# Patient Record
Sex: FEMALE | Race: WHITE | NOT HISPANIC OR LATINO | Employment: UNEMPLOYED | URBAN - METROPOLITAN AREA
[De-identification: names, ages, dates, MRNs, and addresses within clinical notes are randomized per-mention and may not be internally consistent; named-entity substitution may affect disease eponyms.]

---
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
## 2021-08-13 ENCOUNTER — OFFICE VISIT (OUTPATIENT)
Dept: URGENT CARE | Facility: CLINIC | Age: 21
End: 2021-08-13
Payer: COMMERCIAL

## 2021-08-13 VITALS
DIASTOLIC BLOOD PRESSURE: 86 MMHG | WEIGHT: 219 LBS | BODY MASS INDEX: 36.49 KG/M2 | OXYGEN SATURATION: 92 % | TEMPERATURE: 99.3 F | RESPIRATION RATE: 18 BRPM | SYSTOLIC BLOOD PRESSURE: 130 MMHG | HEIGHT: 65 IN | HEART RATE: 81 BPM

## 2021-08-13 DIAGNOSIS — L30.9 DERMATITIS: Primary | ICD-10-CM

## 2021-08-13 PROCEDURE — 99213 OFFICE O/P EST LOW 20 MIN: CPT | Performed by: PHYSICIAN ASSISTANT

## 2021-08-13 RX ORDER — DROSPIRENONE AND ETHINYL ESTRADIOL 0.03MG-3MG
1 KIT ORAL DAILY
COMMUNITY

## 2021-08-13 RX ORDER — METHYLPREDNISOLONE 4 MG/1
TABLET ORAL
Qty: 21 TABLET | Refills: 0 | Status: SHIPPED | OUTPATIENT
Start: 2021-08-13

## 2021-08-13 NOTE — PROGRESS NOTES
[facility]        NAME: Matthew Mccain is a 22 y o  female  : 2000    MRN: [id number]  DATE: 2021  TIME: 5:05 PM    Assessment and Plan   Dermatitis [L30 9]  1  Dermatitis  methylPREDNISolone 4 MG tablet therapy pack         Patient Instructions     Patient Instructions   Take steroid pack as prescribed for suspected dermatitis of right arm pit  If symptoms persist upon finishing steroid try OTC antifungal cream for 1 week, return if symptoms persist following both treatments  Follow up with PCP  Follow up with PCP in 3-5 days  Proceed to  ER if symptoms worsen  Chief Complaint     Chief Complaint   Patient presents with    axila irritation     circular brown/red irritation of rt axila and some discoloration of left x 2/3 days         History of Present Illness       Patient is a 81-year-old female presenting today with right armpit rash x3 days  Patient notes she has had increasing redness and irritation of right armpit over the last few days, has tried applying hydrocortisone cream to area for 1 day with no relief, then notes applying antifungal cream for the last 2 days with no relief  Denies history of irritation to armpits, notes that she does shave the area regularly but has not since the onset of the redness  Denies fever, chills, discharge, drainage, pain, swelling  Review of Systems   Review of Systems   Constitutional: Negative for chills and fever  HENT: Negative for ear pain and sore throat  Eyes: Negative for pain and visual disturbance  Respiratory: Negative for cough and shortness of breath  Cardiovascular: Negative for chest pain and palpitations  Gastrointestinal: Negative for abdominal pain and vomiting  Genitourinary: Negative for dysuria and hematuria  Musculoskeletal: Negative for arthralgias and back pain  Skin: Positive for rash (Right armpit)  Neurological: Negative for seizures and syncope     All other systems reviewed and are negative  Current Medications       Current Outpatient Medications:     drospirenone-ethinyl estradiol (RY) 3-0 03 MG per tablet, Take 1 tablet by mouth daily, Disp: , Rfl:     EQ ALLERGY RELIEF 10 MG tablet, TAKE ONE TABLET BY MOUTH ONCE DAILY (Patient taking differently: daily as needed ), Disp: 30 tablet, Rfl: 3    fluticasone (FLONASE) 50 mcg/act nasal spray, 1 spray into each nostril daily (Patient taking differently: 1 spray into each nostril daily as needed ), Disp: 16 g, Rfl: 2    methylPREDNISolone 4 MG tablet therapy pack, Use as directed on package, Disp: 21 tablet, Rfl: 0    ondansetron (ZOFRAN-ODT) 4 mg disintegrating tablet, Take 1 tablet (4 mg total) by mouth every 8 (eight) hours as needed for nausea or vomiting (Patient not taking: Reported on 8/13/2021), Disp: 15 tablet, Rfl: 0    Current Allergies     Allergies as of 08/13/2021 - Reviewed 08/13/2021   Allergen Reaction Noted    Pollen extract  06/19/2014    Flagyl [metronidazole] Rash 12/14/2016            The following portions of the patient's history were reviewed and updated as appropriate: allergies, current medications, past family history, past medical history, past social history, past surgical history and problem list      Past Medical History:   Diagnosis Date    ADHD (attention deficit hyperactivity disorder)     Allergic rhinitis     Anxiety     Chiari I malformation ([facility] [address] )     Depression     Scoliosis        Past Surgical History:   Procedure Laterality Date    ABDOMINAL SURGERY      colon blockage,then had a dehiscence    ADENOIDECTOMY      CERVICAL FUSION      arnold/bebe malformation    HERNIA REPAIR      incisional hernia repair    SPINE SURGERY      thoracic and lumbar spine surgery with hardware    TONSILLECTOMY         No family history on file  Medications have been verified          Objective   /86   Pulse 81   Temp 99 3 °F (37 4 °C)   Resp 18   Ht 5' 5" (1 651 m)   Wt 99 3 kg (219 lb)   LMP  (LMP Unknown)   SpO2 92%   BMI 36 44 kg/m²        Physical Exam     Physical Exam  Vitals reviewed  Constitutional:       Appearance: Normal appearance  HENT:      Head: Normocephalic and atraumatic  Right Ear: Tympanic membrane, ear canal and external ear normal       Left Ear: Tympanic membrane, ear canal and external ear normal       Nose: Nose normal       Mouth/Throat:      Mouth: Mucous membranes are moist       Pharynx: Oropharynx is clear  Eyes:      Conjunctiva/sclera: Conjunctivae normal    Cardiovascular:      Rate and Rhythm: Normal rate and regular rhythm  Pulses: Normal pulses  Heart sounds: Normal heart sounds  Pulmonary:      Effort: Pulmonary effort is normal       Breath sounds: Normal breath sounds  Musculoskeletal:      Cervical back: Normal range of motion  No tenderness  Lymphadenopathy:      Cervical: No cervical adenopathy  Skin:     Findings: Rash present  Comments: Red nonraised area covering portion of right axillary area, no active discharge or drainage no well-demarcated borders, no tenderness to palpation, area dry to palpation some flaking present  No signs of infection  Findings consistent with dermatitis  Neurological:      General: No focal deficit present  Mental Status: She is alert and oriented to person, place, and time

## 2021-08-13 NOTE — PATIENT INSTRUCTIONS
Take steroid pack as prescribed for suspected dermatitis of right arm pit  If symptoms persist upon finishing steroid try OTC antifungal cream for 1 week, return if symptoms persist following both treatments  Follow up with PCP

## 2022-01-01 ENCOUNTER — TREATMENT (OUTPATIENT)
Dept: URGENT CARE | Facility: CLINIC | Age: 22
End: 2022-01-01
Payer: COMMERCIAL

## 2022-01-01 VITALS — RESPIRATION RATE: 14 BRPM | OXYGEN SATURATION: 98 % | HEART RATE: 99 BPM | TEMPERATURE: 98.2 F

## 2022-01-01 DIAGNOSIS — B34.9 VIRAL SYNDROME: Primary | ICD-10-CM

## 2022-01-01 LAB — S PYO AG THROAT QL: NEGATIVE

## 2022-01-01 PROCEDURE — 87070 CULTURE OTHR SPECIMN AEROBIC: CPT | Performed by: PHYSICIAN ASSISTANT

## 2022-01-01 PROCEDURE — 87880 STREP A ASSAY W/OPTIC: CPT

## 2022-01-01 PROCEDURE — 87636 SARSCOV2 & INF A&B AMP PRB: CPT | Performed by: PHYSICIAN ASSISTANT

## 2022-01-01 NOTE — PATIENT INSTRUCTIONS
Patient Instructions   COVID testing initiated  Results may take up to 5-10 days to return, but often come back sooner (2-4 days)     If the patient has a [facility] My Chart account, results may be accessed on line  If the patient does not have the [facility] account, please establish one so results can be accessed  This will be the easiest and quickest way to get a copy of your test results if you require printed documentation  If patient is symptomatic and until results are obtained, home quarantine / self isolation strongly encouraged  If testing is done for screening purposes and patient is not symptomatic, we still recommend masking, social distancing, good hygiene practices be followed  If COVID test is positive, patient / care giver will be contacted by ordering provider or designated staff  If COVID test is positive, please call the primary care provider office to inform of positive test and request follow up evaluation appointment  (Generally, primary care providers are doing telemedicine visits with their positive COVID patients )  If COVID test is positive, please again review all information below  Further questions may be addressed by the primary care provider or the [address] [place], [place] [address]jak Patton at [phone]  If the patient / caregiver has not heard about test results or has been unable to access results on the patient My Chart account in a timely fashion, please call the provider's office where test was ordered (or Hot Line if applicable)  to inquire about results  If results are negative and patient / care giver has been found to have already accessed results through the [facility] My Chart julissa, no call will be made  Until results are obtained, home quarantine / self isolation strongly encouraged       If the patient would develop profound weakness, chest pain, shortness of breath please proceed to an emergency room for further evaluation otherwise we do recommend that patient follow-up with their primary care provider in the next 5-7 days if not improving  Symptomatic treatment as needed for symptoms relief based on age / medical status of patient  Things like warm salt water gargles, Tylenol or Ibuprofen (if not contraindicated), drinking plenty of fluids, nasal saline rinses / spray, warm tea with honey (not for patients less than 1 year of age),  etc may provide symptoms relief  [address]     Your healthcare provider and/or public health staff have evaluated you and have determined that you do not need to remain in the hospital at this time  At this time you can be isolated at home where you will be monitored by staff from your local or state health department  You should carefully follow the prevention and isolation steps below until a healthcare provider or local or state health department says that you can return to your normal activities  Stay home except to get medical care     People who are mildly ill with COVID-19 are able to isolate at home during their illness  You should restrict activities outside your home, except for getting medical care  Do not go to work, school, or public areas  Avoid using public transportation, ride-sharing, or taxis  Separate yourself from other people and animals in your home     People: As much as possible, you should stay in a specific room and away from other people in your home  Also, you should use a separate bathroom, if available  Animals: You should restrict contact with pets and other animals while you are sick with COVID-19, just like you would around other people  Although there have not been reports of pets or other animals becoming sick with COVID-19, it is still recommended that people sick with COVID-19 limit contact with animals until more information is known about the virus   When possible, have another member of your household care for your animals while you are sick  If you are sick with COVID-19, avoid contact with your pet, including petting, snuggling, being kissed or licked, and sharing food  If you must care for your pet or be around animals while you are sick, wash your hands before and after you interact with pets and wear a facemask  See COVID-19 and Animals for more information  Call ahead before visiting your doctor     If you have a medical appointment, call the healthcare provider and tell them that you have or may have COVID-19  This will help the healthcare providers office take steps to keep other people from getting infected or exposed  Wear a facemask     You should wear a facemask when you are around other people (e g , sharing a room or vehicle) or pets and before you enter a healthcare providers office  If you are not able to wear a facemask (for example, because it causes trouble breathing), then people who live with you should not stay in the same room with you, or they should wear a facemask if they enter your room  Cover your coughs and sneezes     Cover your mouth and nose with a tissue when you cough or sneeze  Throw used tissues in a lined trash can  Immediately wash your hands with soap and water for at least 20 seconds or, if soap and water are not available, clean your hands with an alcohol-based hand  that contains at least 60% alcohol  Clean your hands often     Wash your hands often with soap and water for at least 20 seconds, especially after blowing your nose, coughing, or sneezing; going to the bathroom; and before eating or preparing food  If soap and water are not readily available, use an alcohol-based hand  with at least 60% alcohol, covering all surfaces of your hands and rubbing them together until they feel dry  Soap and water are the best option if hands are visibly dirty  Avoid touching your eyes, nose, and mouth with unwashed hands       Avoid sharing personal household items     You should not share dishes, drinking glasses, cups, eating utensils, towels, or bedding with other people or pets in your home  After using these items, they should be washed thoroughly with soap and water  Clean all high-touch surfaces everyday     High touch surfaces include counters, tabletops, doorknobs, bathroom fixtures, toilets, phones, keyboards, tablets, and bedside tables  Also, clean any surfaces that may have blood, stool, or body fluids on them  Use a household cleaning spray or wipe, according to the label instructions  Labels contain instructions for safe and effective use of the cleaning product including precautions you should take when applying the product, such as wearing gloves and making sure you have good ventilation during use of the product  Monitor your symptoms     Seek prompt medical attention if your illness is worsening (e g , difficulty breathing)  Before seeking care, call your healthcare provider and tell them that you have, or are being evaluated for, COVID-19  Put on a facemask before you enter the facility  These steps will help the healthcare providers office to keep other people in the office or waiting room from getting infected or exposed  Ask your healthcare provider to call the local or [facility] health department  Persons who are placed under active monitoring or facilitated self-monitoring should follow instructions provided by their local health department or occupational health professionals, as appropriate  If you have a medical emergency and need to call 911, notify the dispatch personnel that you have, or are being evaluated for COVID-19  If possible, put on a facemask before emergency medical services arrive       Discontinuing home isolation     Patients with confirmed COVID-19 should remain under home isolation precautions until the following conditions are met:   § They have had no fever for at least 24 hours (that is one full day of no fever without the use medicine that reduces fevers)  AND  § other symptoms have improved (for example, when their cough or shortness of breath have improved)  AND  § at least 10 days have passed since their symptoms first appeared     Patients with confirmed COVID-19 should also notify close contacts (including their workplace) and ask that they self-quarantine  Currently, close contact is defined as being within 6 feet for for a cumulative total of 15 minutes or more over a 24 hour period starting from 2 days before illness onset  (or, for asymptomatic patients, 2 days prior to test specimen collection)  Close contacts of patients diagnosed with COVID-19 should be instructed by the patient to self-quarantine for 14 days from the last time of their last contact with the patient        Source: [employer]

## 2022-01-01 NOTE — LETTER
January 1, 2022       Patient: Frannie Valdivia   YOB: 2000   Date of Visit: 1/1/2022        If Covid and flu tests are negative, patient may return to work/school when fever free for 24 hours without the use of a fever reducing agent  If covid or flu test is positive, patient may return to work five days after the onset of symptoms as long as they are fever free for 24 hours without the use of a fever reducing agent  Upon return, the patient must then adhere to strict masking for an additional 5 days  If you have any questions or concerns, please don't hesitate to call             Sincerely,        Hermelinda Charles PA-C

## 2022-01-01 NOTE — PROGRESS NOTES
[facility]        NAME: Kenia Landrum is a 22 y o  female  : 2000    MRN: [id number]  DATE: 2022  TIME: 12:09 PM    Assessment and Plan   Viral syndrome [B34 9]  1  Viral syndrome  COVID/FLU- Office Collect    POCT rapid strepA    Throat culture         Patient Instructions     Continue to monitor symptoms  If new or worsening symptoms develop, go immediately to Er  Drink plenty of fluids  Follow up with Family Doctor this week  Chief Complaint     Chief Complaint   Patient presents with    Cold Like Symptoms     pt presents with a headache, sore throat, started 1-2 days ago         History of Present Illness       Sore Throat   This is a new problem  The current episode started yesterday  The problem has been gradually worsening  Neither side of throat is experiencing more pain than the other  There has been no fever  The pain is moderate  Associated symptoms include congestion and coughing  Pertinent negatives include no diarrhea, ear pain, headaches, hoarse voice, neck pain, shortness of breath, swollen glands, trouble swallowing or vomiting  She has had no exposure to strep or mono  She has tried nothing for the symptoms  The treatment provided no relief  Not vaccinated  Numerous COVID contacts at work but unknown if any direct exposures  Review of Systems   Review of Systems   Constitutional: Negative for chills, diaphoresis, fatigue and fever  HENT: Positive for congestion, postnasal drip, sinus pressure, sinus pain, sneezing and sore throat  Negative for ear pain, hoarse voice, rhinorrhea, trouble swallowing and voice change  Eyes: Negative  Respiratory: Positive for cough  Negative for chest tightness, shortness of breath and wheezing  Cardiovascular: Negative for chest pain and palpitations  Gastrointestinal: Negative for constipation, diarrhea, nausea and vomiting  Endocrine: Negative  Genitourinary: Negative for dysuria     Musculoskeletal: Negative for back pain, myalgias and neck pain  Skin: Negative for pallor and rash  Allergic/Immunologic: Negative  Neurological: Negative for dizziness, syncope and headaches  Hematological: Negative  Psychiatric/Behavioral: Negative            Current Medications       Current Outpatient Medications:     drospirenone-ethinyl estradiol (RY) 3-0 03 MG per tablet, Take 1 tablet by mouth daily, Disp: , Rfl:     EQ ALLERGY RELIEF 10 MG tablet, TAKE ONE TABLET BY MOUTH ONCE DAILY (Patient taking differently: daily as needed ), Disp: 30 tablet, Rfl: 3    fluticasone (FLONASE) 50 mcg/act nasal spray, 1 spray into each nostril daily (Patient taking differently: 1 spray into each nostril daily as needed ), Disp: 16 g, Rfl: 2    methylPREDNISolone 4 MG tablet therapy pack, Use as directed on package (Patient not taking: Reported on 1/1/2022 ), Disp: 21 tablet, Rfl: 0    ondansetron (ZOFRAN-ODT) 4 mg disintegrating tablet, Take 1 tablet (4 mg total) by mouth every 8 (eight) hours as needed for nausea or vomiting (Patient not taking: Reported on 8/13/2021), Disp: 15 tablet, Rfl: 0    Current Allergies     Allergies as of 01/01/2022 - Reviewed 01/01/2022   Allergen Reaction Noted    Pollen extract  06/19/2014    Flagyl [metronidazole] Rash 12/14/2016            The following portions of the patient's history were reviewed and updated as appropriate: allergies, current medications, past family history, past medical history, past social history, past surgical history and problem list      Past Medical History:   Diagnosis Date    ADHD (attention deficit hyperactivity disorder)     Allergic rhinitis     Anxiety     [facility] malformation ([facility] [address] )     Depression     Scoliosis        Past Surgical History:   Procedure Laterality Date    ABDOMINAL SURGERY      colon blockage,then had a dehiscence    ADENOIDECTOMY      CERVICAL FUSION      arnold/bebe malformation    HERNIA REPAIR      incisional hernia repair   [facility] SPINE SURGERY      thoracic and lumbar spine surgery with hardware    TONSILLECTOMY         History reviewed  No pertinent family history  Medications have been verified  Objective   Pulse 99   Temp 98 2 °F (36 8 °C)   Resp 14   SpO2 98%        Physical Exam     Physical Exam  Vitals and nursing note reviewed  Constitutional:       General: She is not in acute distress  Appearance: Normal appearance  She is well-developed  She is not ill-appearing or diaphoretic  HENT:      Head: Normocephalic and atraumatic  Right Ear: Tympanic membrane, ear canal and external ear normal       Left Ear: Tympanic membrane, ear canal and external ear normal       Nose: Congestion present  No rhinorrhea  Mouth/Throat:      Pharynx: Posterior oropharyngeal erythema present  No oropharyngeal exudate  Eyes:      General:         Right eye: No discharge  Left eye: No discharge  Conjunctiva/sclera: Conjunctivae normal    Cardiovascular:      Rate and Rhythm: Normal rate and regular rhythm  Heart sounds: Normal heart sounds  Pulmonary:      Effort: Pulmonary effort is normal  No respiratory distress  Breath sounds: Normal breath sounds  No wheezing, rhonchi or rales  Musculoskeletal:      Cervical back: Normal range of motion and neck supple  Lymphadenopathy:      Cervical: No cervical adenopathy  Skin:     General: Skin is warm  Capillary Refill: Capillary refill takes less than 2 seconds  Findings: No rash  Neurological:      Mental Status: She is alert

## 2022-01-04 LAB — BACTERIA THROAT CULT: NORMAL

## 2022-01-06 LAB
FLUAV RNA RESP QL NAA+PROBE: NEGATIVE
FLUBV RNA RESP QL NAA+PROBE: NEGATIVE
SARS-COV-2 RNA RESP QL NAA+PROBE: POSITIVE

## 2023-01-15 ENCOUNTER — OFFICE VISIT (OUTPATIENT)
Dept: URGENT CARE | Facility: CLINIC | Age: 23
End: 2023-01-15

## 2023-01-15 VITALS
HEIGHT: 65 IN | RESPIRATION RATE: 18 BRPM | HEART RATE: 91 BPM | TEMPERATURE: 99 F | OXYGEN SATURATION: 100 % | WEIGHT: 228 LBS | BODY MASS INDEX: 37.99 KG/M2

## 2023-01-15 DIAGNOSIS — R30.0 DYSURIA: Primary | ICD-10-CM

## 2023-01-15 RX ORDER — NITROFURANTOIN 25; 75 MG/1; MG/1
100 CAPSULE ORAL 2 TIMES DAILY
Qty: 10 CAPSULE | Refills: 0 | Status: SHIPPED | OUTPATIENT
Start: 2023-01-15

## 2023-01-15 NOTE — PROGRESS NOTES
[facility]        NAME: Abdi Saleem is a 25 y o  female  : 2000    MRN: [id number]  DATE: January 15, 2023  TIME: 3:14 PM    Assessment and Plan   Dysuria [R30 0]  1  Dysuria  nitrofurantoin (MACROBID) 100 mg capsule    Urine culture            Patient Instructions   Dysuria:   -The patients hx and sx are most consistent with an acute uncomplicated UTI as she had leukocytes and nitrites on the home test kit  U/A unable to be run here due to AZO  Will treat with Macrobid 100mg taken as prescribed  Take with food and a probiotic  She has no fever, chills, body aches  No flank pain or CVA tenderness as per patient   -cx ordered today  Call in 48 hours for your results  -Warm heating pad on the abdomen for comfort  -AZO/Uricalm for pain control, do not take for more than 48 hours as this can mask worsening symptoms    -Stay VERY well hydrated and push fluids  You want your urine clear    -Prevention and precautions discussed  -If your sx worsen or persists, see your PCP or OBGYN immediately as discussed  Red flag signs discussed in depth  Follow up with PCP in 3-5 days  Proceed to  ER if symptoms worsen  Chief Complaint     Chief Complaint   Patient presents with   • Possible UTI     UTI s/s  History of Present Illness       The patient presents today for concern of UTI with urinary urgency, frequency and dysuria x 8 hours  She has taken one dose AZO today  She has no fever, chills, body aches  No nausea, vomiting, diarrhea  No flank pain, abdominal pain, pelvic pain  No hematuria  No abnormal vaginal bleeding or discharge  No vaginal pruritis or irritation  LMP was: She states that she has irregular menses, her last menstrual cycle was 2 months ago  She denies a chance of pregnancy  She took a home UTI test which showed nitrites and leukocytes           Review of Systems   Review of Systems   Constitutional: Negative for activity change, appetite change, chills, fatigue and fever    Respiratory: Negative for cough, chest tightness, shortness of breath and wheezing  Cardiovascular: Negative for chest pain and palpitations  Gastrointestinal: Negative for abdominal distention, abdominal pain, blood in stool, constipation, diarrhea, nausea and vomiting  Genitourinary: Positive for dysuria, frequency and urgency  Negative for decreased urine volume, flank pain, hematuria, vaginal bleeding, vaginal discharge and vaginal pain  Musculoskeletal: Negative for arthralgias and myalgias  Skin: Negative for rash  Hematological: Negative for adenopathy  Does not bruise/bleed easily           Current Medications       Current Outpatient Medications:   •  nitrofurantoin (MACROBID) 100 mg capsule, Take 1 capsule (100 mg total) by mouth 2 (two) times a day, Disp: 10 capsule, Rfl: 0  •  EQ ALLERGY RELIEF 10 MG tablet, TAKE ONE TABLET BY MOUTH ONCE DAILY (Patient taking differently: daily as needed ), Disp: 30 tablet, Rfl: 3  •  fluticasone (FLONASE) 50 mcg/act nasal spray, 1 spray into each nostril daily (Patient taking differently: 1 spray into each nostril daily as needed ), Disp: 16 g, Rfl: 2  •  methylPREDNISolone 4 MG tablet therapy pack, Use as directed on package (Patient not taking: Reported on 1/1/2022 ), Disp: 21 tablet, Rfl: 0  •  ondansetron (ZOFRAN-ODT) 4 mg disintegrating tablet, Take 1 tablet (4 mg total) by mouth every 8 (eight) hours as needed for nausea or vomiting (Patient not taking: Reported on 8/13/2021), Disp: 15 tablet, Rfl: 0    Current Allergies     Allergies as of 01/15/2023 - Reviewed 01/15/2023   Allergen Reaction Noted   • Pollen extract  06/19/2014   • Flagyl [metronidazole] Rash 12/14/2016            The following portions of the patient's history were reviewed and updated as appropriate: allergies, current medications, past family history, past medical history, past social history, past surgical history and problem list      Past Medical History:   Diagnosis Date   • ADHD (attention deficit hyperactivity disorder)    • Allergic rhinitis    • Anxiety    • Chiari I malformation ([address] )    • Depression    • Scoliosis        Past Surgical History:   Procedure Laterality Date   • ABDOMINAL SURGERY      colon blockage,then had a dehiscence   • ADENOIDECTOMY     • CERVICAL FUSION      arnold/bebe malformation   • HERNIA REPAIR      incisional hernia repair   • SPINE SURGERY      thoracic and lumbar spine surgery with hardware   • TONSILLECTOMY         History reviewed  No pertinent family history  Medications have been verified  Objective   Pulse 91   Temp 99 °F (37 2 °C)   Resp 18   Ht 5' 5" (1 651 m)   Wt 103 kg (228 lb)   SpO2 100%   BMI 37 94 kg/m²   No LMP recorded  Physical Exam     Physical Exam  Vitals and nursing note reviewed  Constitutional:       General: She is not in acute distress  Appearance: Normal appearance  She is well-developed  She is not diaphoretic  Cardiovascular:      Rate and Rhythm: Normal rate and regular rhythm  Heart sounds: Normal heart sounds  Pulmonary:      Effort: Pulmonary effort is normal       Breath sounds: Normal breath sounds  Abdominal:      General: Abdomen is flat  Bowel sounds are normal  There is no distension  Palpations: Abdomen is soft  Abdomen is not rigid  Tenderness: There is no abdominal tenderness  There is no right CVA tenderness, left CVA tenderness, guarding or rebound  Negative signs include Henson's sign and McBurney's sign  Hernia: No hernia is present  Skin:     General: Skin is warm and dry  Capillary Refill: Capillary refill takes less than 2 seconds     Psychiatric:         Behavior: Behavior normal

## 2023-01-15 NOTE — PATIENT INSTRUCTIONS
Dysuria   WHAT YOU NEED TO KNOW:   Dysuria is difficulty urinating, or pain, burning, or discomfort with urination  Dysuria is usually a symptom of another problem  DISCHARGE INSTRUCTIONS:   Return to the emergency department if:   You have severe back, side, or abdominal pain  You have fever and shaking chills  You vomit several times in a row  Contact your healthcare provider if:   Your symptoms do not go away, even after treatment  You have questions or concerns about your condition or care  Medicines:   Medicines  may be given to help treat a bacterial infection or help decrease bladder spasms  Take your medicine as directed  Contact your healthcare provider if you think your medicine is not helping or if you have side effects  Tell him of her if you are allergic to any medicine  Keep a list of the medicines, vitamins, and herbs you take  Include the amounts, and when and why you take them  Bring the list or the pill bottles to follow-up visits  Carry your medicine list with you in case of an emergency  Follow up with your healthcare provider as directed: Your healthcare provider may also refer you to a urologist or nephrologist to have additional testing  Write down your questions so you remember to ask them during your visits  Manage your dysuria:   Drink more liquids  Liquids help flush out bacteria that may be causing an infection  Ask your healthcare provider how much liquid to drink each day and which liquids are best for you  Take sitz baths as directed  Fill a bathtub with 4 to 6 inches of warm water  You may also use a sitz bath pan that fits over a toilet  Sit in the sitz bath for 20 minutes  Do this 2 to 3 times a day, or as directed  The warm water can help decrease pain and swelling  © Copyright [employer] 2022 Information is for End User's use only and may not be sold, redistributed or otherwise used for commercial purposes   All illustrations and images included in [facility] are the copyrighted property of [employer]  or [address] [place] [address]  The above information is an  only  It is not intended as medical advice for individual conditions or treatments  Talk to your doctor, nurse or pharmacist before following any medical regimen to see if it is safe and effective for you  Dysuria:   -The patients hx and sx are most consistent with an acute uncomplicated UTI as she had leukocytes and nitrites on the home test kit  U/A unable to be run here due to AZO  Will treat with Macrobid 100mg taken as prescribed  Take with food and a probiotic  She has no fever, chills, body aches  No flank pain or CVA tenderness as per patient   -cx ordered today  Call in 48 hours for your results  -Warm heating pad on the abdomen for comfort  -AZO/Uricalm for pain control, do not take for more than 48 hours as this can mask worsening symptoms    -Stay VERY well hydrated and push fluids  You want your urine clear    -Prevention and precautions discussed  -If your sx worsen or persists, see your PCP or OBGYN immediately as discussed  Red flag signs discussed in depth

## 2023-01-17 LAB — BACTERIA UR CULT: NORMAL

## 2023-10-27 ENCOUNTER — OFFICE VISIT (OUTPATIENT)
Dept: URGENT CARE | Facility: CLINIC | Age: 23
End: 2023-10-27

## 2023-10-27 VITALS
WEIGHT: 196.6 LBS | SYSTOLIC BLOOD PRESSURE: 110 MMHG | BODY MASS INDEX: 31.6 KG/M2 | HEART RATE: 84 BPM | TEMPERATURE: 98.8 F | OXYGEN SATURATION: 98 % | RESPIRATION RATE: 18 BRPM | DIASTOLIC BLOOD PRESSURE: 70 MMHG | HEIGHT: 66 IN

## 2023-10-27 DIAGNOSIS — J02.9 SORE THROAT: Primary | ICD-10-CM

## 2023-10-27 LAB — S PYO AG THROAT QL: NEGATIVE

## 2023-10-27 PROCEDURE — 99213 OFFICE O/P EST LOW 20 MIN: CPT | Performed by: FAMILY MEDICINE

## 2023-10-27 PROCEDURE — 87070 CULTURE OTHR SPECIMN AEROBIC: CPT | Performed by: FAMILY MEDICINE

## 2023-10-27 PROCEDURE — 87880 STREP A ASSAY W/OPTIC: CPT | Performed by: FAMILY MEDICINE

## 2023-10-27 NOTE — LETTER
October 27, 2023     Patient: Ira Seip   YOB: 2000   Date of Visit: 10/27/2023       To Whom It May Concern:    Ira Seip was evaluated in my office on 10/27/2023. Please excuse her absence. May return to work on 10/28/2023. If you have any questions or concerns, please don't hesitate to call.          Sincerely,        Rachel Pereyra MD

## 2023-10-27 NOTE — PROGRESS NOTES
[facility]        NAME: Hayes Lemon is a 26 y.o. female  : 2000    MRN: [id number]  DATE: 2023  TIME: 1:03 PM    Assessment and Plan   Sore throat [J02.9]  1. Sore throat  POCT rapid strepA        Negative rapid strep test.  Likely experiencing a viral URI. Advised on supportive measures including hydrating with plenty of water, gargling with warm salt water and having a preference for warm teas and hot soups. May continue with OTC medications. Patient Instructions     Follow up with PCP in 3-5 days. Proceed to  ER if symptoms worsen. Chief Complaint     Chief Complaint   Patient presents with    Sore Throat    Cold Like Symptoms     Started yesterday with sore throat. Today some nasal congestion and dry cough, Took TheraFlu last night and DayQuil today. Had fever 102 last night and today 100.8. History of Present Illness       78-year-old female presents today with a sore throat which started yesterday. Complicated by nasal congestion, dry cough, diaphoresis, chills and fevers up to 102 degrees last night which has steadily improved since taking TheraFlu last night and DayQuil this morning. Reports that her nephew had a mild cough prior to the onset of her symptoms. Review of Systems   Review of Systems   Constitutional:  Positive for chills, diaphoresis and fever. HENT:  Positive for congestion, rhinorrhea and sore throat. Negative for ear pain. Respiratory:  Positive for cough. Negative for shortness of breath. Cardiovascular:  Negative for chest pain. Gastrointestinal:  Positive for nausea. Negative for abdominal pain. Neurological:  Positive for dizziness and headaches.        Current Medications       Current Outpatient Medications:     EQ ALLERGY RELIEF 10 MG tablet, TAKE ONE TABLET BY MOUTH ONCE DAILY (Patient taking differently: daily as needed), Disp: 30 tablet, Rfl: 3    fluticasone (FLONASE) 50 mcg/act nasal spray, 1 spray into each nostril daily (Patient taking differently: 1 spray into each nostril daily as needed), Disp: 16 g, Rfl: 2    Current Allergies     Allergies as of 10/27/2023 - Reviewed 10/27/2023   Allergen Reaction Noted    Pollen extract  06/19/2014    Flagyl [metronidazole] Rash 12/14/2016            The following portions of the patient's history were reviewed and updated as appropriate: allergies, current medications, past family history, past medical history, past social history, past surgical history and problem list.     Past Medical History:   Diagnosis Date    ADHD (attention deficit hyperactivity disorder)     Allergic rhinitis     Anxiety     Chiari I malformation ([address])     Depression     Scoliosis        Past Surgical History:   Procedure Laterality Date    ABDOMINAL SURGERY      colon blockage,then had a dehiscence    ADENOIDECTOMY      CERVICAL FUSION      arnold/bebe malformation    HERNIA REPAIR      incisional hernia repair    SPINE SURGERY      thoracic and lumbar spine surgery with hardware    TONSILLECTOMY         No family history on file. Medications have been verified. Objective   /70   Pulse 84   Temp 98.8 °F (37.1 °C)   Resp 18   Ht 5' 6" (1.676 m)   Wt 89.2 kg (196 lb 9.6 oz)   LMP 10/24/2023 (Exact Date)   SpO2 98%   BMI 31.73 kg/m²   Patient's last menstrual period was 10/24/2023 (exact date). Physical Exam     Physical Exam  Vitals and nursing note reviewed. Constitutional:       General: She is in acute distress. Appearance: Normal appearance. She is well-developed and normal weight. She is not ill-appearing, toxic-appearing or diaphoretic. HENT:      Head: Normocephalic and atraumatic. Mouth/Throat:      Mouth: Mucous membranes are moist.      Pharynx: Oropharynx is clear. No oropharyngeal exudate or posterior oropharyngeal erythema. Tonsils: 1+ on the right. 1+ on the left. Eyes:      General:         Right eye: No discharge.          Left eye: No discharge. Conjunctiva/sclera: Conjunctivae normal.   Neck:      Thyroid: No thyromegaly. Cardiovascular:      Rate and Rhythm: Normal rate and regular rhythm. Pulmonary:      Effort: Pulmonary effort is normal. No respiratory distress. Breath sounds: Normal breath sounds. No wheezing, rhonchi or rales. Abdominal:      General: Abdomen is flat. Lymphadenopathy:      Cervical: No cervical adenopathy. Skin:     General: Skin is warm. Findings: No erythema. Neurological:      General: No focal deficit present. Mental Status: She is alert and oriented to person, place, and time. Psychiatric:         Mood and Affect: Mood normal.         Behavior: Behavior normal.         Thought Content:  Thought content normal.         Judgment: Judgment normal.

## 2023-10-29 LAB — BACTERIA THROAT CULT: NORMAL

## 2024-05-19 ENCOUNTER — OFFICE VISIT (OUTPATIENT)
Dept: URGENT CARE | Facility: CLINIC | Age: 24
End: 2024-05-19

## 2024-05-19 VITALS
OXYGEN SATURATION: 98 % | RESPIRATION RATE: 18 BRPM | DIASTOLIC BLOOD PRESSURE: 76 MMHG | HEART RATE: 76 BPM | WEIGHT: 202.8 LBS | TEMPERATURE: 98.4 F | SYSTOLIC BLOOD PRESSURE: 118 MMHG | BODY MASS INDEX: 32.73 KG/M2

## 2024-05-19 DIAGNOSIS — R51.9 ACUTE NONINTRACTABLE HEADACHE, UNSPECIFIED HEADACHE TYPE: Primary | ICD-10-CM

## 2024-05-19 PROCEDURE — 99213 OFFICE O/P EST LOW 20 MIN: CPT | Performed by: PHYSICIAN ASSISTANT

## 2024-05-19 RX ORDER — ONDANSETRON 4 MG/1
4 TABLET, ORALLY DISINTEGRATING ORAL EVERY 6 HOURS PRN
Qty: 20 TABLET | Refills: 0 | Status: SHIPPED | OUTPATIENT
Start: 2024-05-19

## 2024-05-19 NOTE — LETTER
May 19, 2024     Patient: Allison Barragan   YOB: 2000   Date of Visit: 5/19/2024       To Whom it May Concern:    Allison Barragan was seen in my clinic on 5/19/2024. She may return to work on 5/20/2024 .    If you have any questions or concerns, please don't hesitate to call.         Sincerely,          Tamanna Sheffield PA-C        CC: No Recipients

## 2024-05-19 NOTE — PROGRESS NOTES
[facility]        NAME: Allison Barragan is a 23 y.o. female  : 2000    MRN: [id number]  DATE: May 19, 2024  TIME: 3:46 PM    Assessment and Plan   Acute nonintractable headache, unspecified headache type [R51.9]  1. Acute nonintractable headache, unspecified headache type  ondansetron (ZOFRAN-ODT) 4 mg disintegrating tablet        May be related to allergies or dehydration.  Discussed supportive care and pcp follow up. Discussed strict return to care precautions as well as red flag symptoms which should prompt immediate ED referral. Pt verbalized understanding and is in agreement with plan.  Please follow up with your primary care provider within the next week. Please remember that your visit today was with an urgent care provider and should not replace follow up with your primary care provider for chronic medical issues or annual physicals.       Patient Instructions       Follow up with PCP in 3-5 days.  Proceed to  ER if symptoms worsen.    If tests are performed, our office will contact you with results only if changes need to made to the care plan discussed with you at the visit. You can review your full results on [facility].    Chief Complaint     Chief Complaint   Patient presents with    Nausea     Pt states she has been having headaches for about a week and started with nausea last night. Pt states no head injury has caused this. OTC- aleve (slight relief)         History of Present Illness       Patient is a 23-year-old female with PMH seasonal allergies presenting with intermittent headache, dizziness, nausea x 1 week.  Also endorses sneezing and thinks this may all be related to her allergies.  Does have a history of migraines presenting similarly but less severe.  Currently rates headache as 3 out of 10 in severity.  No OTC meds taken today.  States she feels most dizzy when getting up too quickly.  Drinks about 3 bottles of water per day.  Denies any chance of  pregnancy.        Review of Systems   Review of Systems   Constitutional:  Positive for fatigue. Negative for chills, diaphoresis and fever.   HENT:  Positive for rhinorrhea and sneezing. Negative for congestion, ear pain, sinus pain, sore throat and trouble swallowing.    Eyes:  Negative for pain and redness.   Respiratory:  Negative for cough, chest tightness, shortness of breath and wheezing.    Cardiovascular:  Negative for chest pain and leg swelling.   Gastrointestinal:  Positive for nausea. Negative for diarrhea and vomiting.   Musculoskeletal:  Negative for myalgias.   Neurological:  Positive for dizziness and headaches. Negative for weakness.         Current Medications       Current Outpatient Medications:     ondansetron (ZOFRAN-ODT) 4 mg disintegrating tablet, Take 1 tablet (4 mg total) by mouth every 6 (six) hours as needed for nausea or vomiting, Disp: 20 tablet, Rfl: 0    EQ ALLERGY RELIEF 10 MG tablet, TAKE ONE TABLET BY MOUTH ONCE DAILY (Patient not taking: Reported on 5/19/2024), Disp: 30 tablet, Rfl: 3    fluticasone (FLONASE) 50 mcg/act nasal spray, 1 spray into each nostril daily (Patient not taking: Reported on 5/19/2024), Disp: 16 g, Rfl: 2    Current Allergies     Allergies as of 05/19/2024 - Reviewed 05/19/2024   Allergen Reaction Noted    Pollen extract  06/19/2014    Flagyl [metronidazole] Rash 12/14/2016            The following portions of the patient's history were reviewed and updated as appropriate: allergies, current medications, past family history, past medical history, past social history, past surgical history and problem list.     Past Medical History:   Diagnosis Date    ADHD (attention deficit hyperactivity disorder)     Allergic rhinitis     Anxiety     Chiari I malformation ([facility])     Depression     Scoliosis        Past Surgical History:   Procedure Laterality Date    ABDOMINAL SURGERY      colon blockage,then had a dehiscence    ADENOIDECTOMY      CERVICAL FUSION       arnold/bebe malformation    HERNIA REPAIR      incisional hernia repair    SPINE SURGERY      thoracic and lumbar spine surgery with hardware    TONSILLECTOMY         History reviewed. No pertinent family history.      Medications have been verified.        Objective   /76 (BP Location: Left arm, Patient Position: Sitting)   Pulse 76   Temp 98.4 °F (36.9 °C)   Resp 18   Wt 92 kg (202 lb 12.8 oz)   LMP 05/08/2024 (Exact Date)   SpO2 98%   BMI 32.73 kg/m²        Physical Exam     Physical Exam  Vitals and nursing note reviewed.   Constitutional:       General: She is not in acute distress.     Appearance: Normal appearance. She is not toxic-appearing.   HENT:      Head: Normocephalic and atraumatic.      Right Ear: Tympanic membrane, ear canal and external ear normal.      Left Ear: Tympanic membrane, ear canal and external ear normal.      Nose: No congestion.      Mouth/Throat:      Mouth: Mucous membranes are moist.      Pharynx: Oropharynx is clear. No oropharyngeal exudate or posterior oropharyngeal erythema.   Eyes:      Extraocular Movements: Extraocular movements intact.      Conjunctiva/sclera: Conjunctivae normal.      Pupils: Pupils are equal, round, and reactive to light.   Cardiovascular:      Rate and Rhythm: Normal rate and regular rhythm.      Heart sounds: Normal heart sounds.   Pulmonary:      Effort: Pulmonary effort is normal. No respiratory distress.      Breath sounds: Normal breath sounds. No wheezing, rhonchi or rales.   Musculoskeletal:      Cervical back: Normal range of motion and neck supple.   Lymphadenopathy:      Cervical: No cervical adenopathy.   Skin:     General: Skin is warm and dry.      Capillary Refill: Capillary refill takes less than 2 seconds.   Neurological:      General: No focal deficit present.      Mental Status: She is alert and oriented to person, place, and time.      Cranial Nerves: No cranial nerve deficit.      Motor: No weakness.   Psychiatric:          Behavior: Behavior normal.

## 2025-05-05 ENCOUNTER — OFFICE VISIT (OUTPATIENT)
Dept: URGENT CARE | Facility: CLINIC | Age: 25
End: 2025-05-05
Payer: COMMERCIAL

## 2025-05-05 VITALS
SYSTOLIC BLOOD PRESSURE: 124 MMHG | HEIGHT: 66 IN | WEIGHT: 206.6 LBS | BODY MASS INDEX: 33.2 KG/M2 | HEART RATE: 76 BPM | OXYGEN SATURATION: 98 % | TEMPERATURE: 98.5 F | DIASTOLIC BLOOD PRESSURE: 68 MMHG

## 2025-05-05 DIAGNOSIS — G43.909 MIGRAINE WITHOUT STATUS MIGRAINOSUS, NOT INTRACTABLE, UNSPECIFIED MIGRAINE TYPE: ICD-10-CM

## 2025-05-05 DIAGNOSIS — J30.2 SEASONAL ALLERGIC RHINITIS, UNSPECIFIED TRIGGER: Primary | ICD-10-CM

## 2025-05-05 PROCEDURE — 99203 OFFICE O/P NEW LOW 30 MIN: CPT | Performed by: PHYSICIAN ASSISTANT

## 2025-05-05 NOTE — PROGRESS NOTES
[facility]        NAME: Allison Barragan is a 24 y.o. female  : 2000    MRN: [id number]  DATE: May 5, 2025  TIME: 3:46 PM    Assessment and Plan   Seasonal allergic rhinitis, unspecified trigger [J30.2]  1. Seasonal allergic rhinitis, unspecified trigger        2. Migraine without status migrainosus, not intractable, unspecified migraine type          Recommend trialing a different antihistamine and adding flonase. Recommend starting a headache diary to try to identify potential triggers. If they are not improving should fu with pcp.    Discussed strict return to care precautions as well as red flag symptoms which should prompt immediate ED referral. Pt verbalized understanding and is in agreement with plan.  Please follow up with your primary care provider within the next week. Please remember that your visit today was with an urgent care provider and should not replace follow up with your primary care provider for chronic medical issues or annual physicals.       Patient Instructions       Follow up with PCP in 3-5 days.  Proceed to  ER if symptoms worsen.    If tests are performed, our office will contact you with results only if changes need to made to the care plan discussed with you at the visit. You can review your full results on [facility].    Chief Complaint     Chief Complaint   Patient presents with    Allergies     Pt is stating allergies and migraine are very bad,allergies started 2-3 weeks ago,skin gets itchy,eyes get itchy and swollen,runny stuffy nose and sometimes throat feels really dry.migraines started get worse with allergy symptoms.has taken allergy meds([employer]) minor relief.for migraines takes Advil and sometimes it works          History of Present Illness       Pt is a 23 yo female with pmh seasonal allergies pw migraine and seasonal allergies x 3 weeks. Taking antihistamines. Has itchy eyes, runny nose/congestion that comes and goes. No cough, sore throat,  fevers, ear pain, sinus pressure/pain, teeth pain. Has also been getting roughly 2 migraines/week for the last 3 weeks for which she takes advil or tylenol. Feels like frontal pressure and is associated with nausea and lightheadedness. Denies photophobia, phonophobia, numbness/tingling. They usually go away after sleeping in a dark room.         Review of Systems   Review of Systems   Constitutional:  Negative for chills, diaphoresis, fatigue and fever.   HENT:  Positive for postnasal drip and rhinorrhea. Negative for congestion, ear pain, sinus pain, sneezing, sore throat and trouble swallowing.    Eyes:  Positive for itching. Negative for pain and redness.   Respiratory:  Negative for cough, chest tightness, shortness of breath and wheezing.    Cardiovascular:  Negative for chest pain and leg swelling.   Gastrointestinal:  Negative for diarrhea and vomiting.   Musculoskeletal:  Negative for myalgias.   Neurological:  Positive for headaches. Negative for dizziness and weakness.         Current Medications       Current Outpatient Medications:     ondansetron (ZOFRAN-ODT) 4 mg disintegrating tablet, Take 1 tablet (4 mg total) by mouth every 6 (six) hours as needed for nausea or vomiting, Disp: 20 tablet, Rfl: 0    EQ ALLERGY RELIEF 10 MG tablet, TAKE ONE TABLET BY MOUTH ONCE DAILY (Patient not taking: Reported on 5/19/2024), Disp: 30 tablet, Rfl: 3    fluticasone (FLONASE) 50 mcg/act nasal spray, 1 spray into each nostril daily (Patient not taking: Reported on 5/19/2024), Disp: 16 g, Rfl: 2    Current Allergies     Allergies as of 05/05/2025 - Reviewed 05/05/2025   Allergen Reaction Noted    Pollen extract  06/19/2014    Flagyl [metronidazole] Rash 12/14/2016            The following portions of the patient's history were reviewed and updated as appropriate: allergies, current medications, past family history, past medical history, past social history, past surgical history and problem list.     Past Medical  "History:   Diagnosis Date    ADHD (attention deficit hyperactivity disorder)     Allergic rhinitis     Anxiety     Chiari I malformation (HCC)     Depression     Scoliosis        Past Surgical History:   Procedure Laterality Date    ABDOMINAL SURGERY      colon blockage,then had a dehiscence    ADENOIDECTOMY      CERVICAL FUSION      arnold/bebe malformation    HERNIA REPAIR      incisional hernia repair    SPINE SURGERY      thoracic and lumbar spine surgery with hardware    TONSILLECTOMY         History reviewed. No pertinent family history.      Medications have been verified.        Objective   /68   Pulse 76   Temp 98.5 °F (36.9 °C)   Ht 5' 6\" (1.676 m)   Wt 93.7 kg (206 lb 9.6 oz)   SpO2 98%   BMI 33.35 kg/m²        Physical Exam     Physical Exam  Vitals and nursing note reviewed.   Constitutional:       General: She is not in acute distress.     Appearance: Normal appearance. She is not toxic-appearing.   HENT:      Head: Normocephalic and atraumatic.      Right Ear: Tympanic membrane, ear canal and external ear normal.      Left Ear: Tympanic membrane, ear canal and external ear normal.      Nose: No congestion.      Mouth/Throat:      Mouth: Mucous membranes are moist.      Pharynx: Oropharynx is clear. No oropharyngeal exudate or posterior oropharyngeal erythema.   Eyes:      Conjunctiva/sclera: Conjunctivae normal.      Pupils: Pupils are equal, round, and reactive to light.   Cardiovascular:      Rate and Rhythm: Normal rate and regular rhythm.      Heart sounds: Normal heart sounds.   Pulmonary:      Effort: Pulmonary effort is normal. No respiratory distress.      Breath sounds: Normal breath sounds. No wheezing, rhonchi or rales.   Musculoskeletal:      Cervical back: Normal range of motion and neck supple.   Lymphadenopathy:      Cervical: No cervical adenopathy.   Skin:     General: Skin is warm and dry.      Capillary Refill: Capillary refill takes less than 2 seconds. "   Neurological:      General: No focal deficit present.      Mental Status: She is alert and oriented to person, place, and time.      Cranial Nerves: No cranial nerve deficit.   Psychiatric:         Behavior: Behavior normal.